# Patient Record
Sex: MALE | Race: BLACK OR AFRICAN AMERICAN | NOT HISPANIC OR LATINO | URBAN - METROPOLITAN AREA
[De-identification: names, ages, dates, MRNs, and addresses within clinical notes are randomized per-mention and may not be internally consistent; named-entity substitution may affect disease eponyms.]

---

## 2019-05-05 ENCOUNTER — EMERGENCY (EMERGENCY)
Facility: HOSPITAL | Age: 29
LOS: 1 days | Discharge: HOME | End: 2019-05-05
Attending: EMERGENCY MEDICINE | Admitting: EMERGENCY MEDICINE
Payer: COMMERCIAL

## 2019-05-05 VITALS
OXYGEN SATURATION: 100 % | RESPIRATION RATE: 16 BRPM | TEMPERATURE: 99 F | HEART RATE: 93 BPM | DIASTOLIC BLOOD PRESSURE: 100 MMHG | SYSTOLIC BLOOD PRESSURE: 135 MMHG

## 2019-05-05 PROCEDURE — 99283 EMERGENCY DEPT VISIT LOW MDM: CPT | Mod: 25

## 2019-05-05 NOTE — ED ADULT TRIAGE NOTE - CHIEF COMPLAINT QUOTE
fevers this week and headache, and neck discomfort. Denies photosensitivity. Denies n/v/d. +sick contacts. Denies CP/SOB/dizziness/weakness

## 2019-05-06 VITALS
RESPIRATION RATE: 16 BRPM | DIASTOLIC BLOOD PRESSURE: 77 MMHG | SYSTOLIC BLOOD PRESSURE: 121 MMHG | HEART RATE: 80 BPM | OXYGEN SATURATION: 100 % | TEMPERATURE: 99 F

## 2019-05-06 RX ORDER — METOCLOPRAMIDE HCL 10 MG
10 TABLET ORAL ONCE
Qty: 0 | Refills: 0 | Status: COMPLETED | OUTPATIENT
Start: 2019-05-06 | End: 2019-05-06

## 2019-05-06 RX ORDER — KETOROLAC TROMETHAMINE 30 MG/ML
30 SYRINGE (ML) INJECTION ONCE
Qty: 0 | Refills: 0 | Status: DISCONTINUED | OUTPATIENT
Start: 2019-05-06 | End: 2019-05-06

## 2019-05-06 RX ORDER — DIPHENHYDRAMINE HCL 50 MG
50 CAPSULE ORAL ONCE
Qty: 0 | Refills: 0 | Status: COMPLETED | OUTPATIENT
Start: 2019-05-06 | End: 2019-05-06

## 2019-05-06 RX ADMIN — Medication 10 MILLIGRAM(S): at 01:08

## 2019-05-06 RX ADMIN — Medication 50 MILLIGRAM(S): at 01:08

## 2019-05-06 RX ADMIN — Medication 30 MILLIGRAM(S): at 01:08

## 2019-05-06 NOTE — ED PROVIDER NOTE - OBJECTIVE STATEMENT
28M p/w c/o headache, waxing and waning, pressure-like, right sided radiating into neck for 3 days. Pt had fever for 4 days Wednesday through Saturday, resolved as of this AM. One episode of nausea on Friday night. No vomiting. No diarrhea, no nasal congestion, no cough, no sore throat. No abdominal pain. Pt has been taking Advil and extra strength Tylenol for past 2 days with moderate improvement. "Whole family" has been sick since last week. 28M p/w c/o headache, waxing and waning, pressure-like, right sided radiating into neck for 3 days. Pt had fever for 4 days Wednesday through Saturday, resolved as of this AM. One episode of nausea on Friday night. No vomiting. No diarrhea, no nasal congestion, no cough, no sore throat. No abdominal pain. Pt has been taking Advil and extra strength Tylenol for past 2 days with moderate improvement. Multiple sick contacts in family with URI Sx since last week.

## 2019-05-06 NOTE — ED PROVIDER NOTE - NSFOLLOWUPINSTRUCTIONS_ED_ALL_ED_FT
1. FOR PAIN OR FEVER YOU CAN TAKE IBUPROFEN (MOTRIN, ADVIL) OR ACETAMINOPHEN (TYLENOL) AS NEEDED, AS DIRECTED ON PACKAGING.  3. FOLLOW UP WITH YOUR PRIMARY CARE PROVIDER IN 24-48 HOURS. CALL OFFICE FOR APPOINTMENT.  4. IF NEEDED, CALL PATIENT ACCESS SERVICES AT [phone] TO FIND A PRIMARY CARE PHYSICIAN.  OR CALL [phone] TO MAKE AN APPOINTMENT WITH THE CLINIC.  5. RETURN TO THE ER FOR ANY WORSENING SYMPTOMS OR CONCERNS.

## 2019-05-06 NOTE — ED PROVIDER NOTE - ENMT, MLM
Airway patent, Nasal mucosa clear. Mouth with normal mucosa. Throat has no vesicles, no oropharyngeal exudates and uvula is midline. No sinus TTP.

## 2019-05-06 NOTE — ED PROVIDER NOTE - CLINICAL SUMMARY MEDICAL DECISION MAKING FREE TEXT BOX
Headache and fever. Non-focal neuro exam, afebrile, neck supple in context of 5 days of Sx, bacterial meningitis unlikely. Discussed LP for r/o viral meningitis, which pt understood and would like to defer at this time. Will give supportive care for headache with Toradol, Reglan and Benadryl. Return if warning signs develop.